# Patient Record
Sex: MALE | Race: WHITE | URBAN - METROPOLITAN AREA
[De-identification: names, ages, dates, MRNs, and addresses within clinical notes are randomized per-mention and may not be internally consistent; named-entity substitution may affect disease eponyms.]

---

## 2024-11-27 ENCOUNTER — APPOINTMENT (RX ONLY)
Dept: URBAN - METROPOLITAN AREA CLINIC 82 | Facility: CLINIC | Age: 19
Setting detail: DERMATOLOGY
End: 2024-11-27

## 2024-11-27 DIAGNOSIS — B07.8 OTHER VIRAL WARTS: ICD-10-CM

## 2024-11-27 PROCEDURE — ? IMMUNOTHERAPY: CANDIDA ANTIGEN

## 2024-11-27 PROCEDURE — ? LIQUID NITROGEN

## 2024-11-27 PROCEDURE — ? COUNSELING

## 2024-11-27 PROCEDURE — 17110 DESTRUCTION B9 LES UP TO 14: CPT

## 2024-11-27 PROCEDURE — 11900 INJECT SKIN LESIONS </W 7: CPT | Mod: 59

## 2024-11-27 ASSESSMENT — LOCATION DETAILED DESCRIPTION DERM
LOCATION DETAILED: RIGHT PROXIMAL DORSAL FOREARM
LOCATION DETAILED: LEFT KNEE

## 2024-11-27 ASSESSMENT — LOCATION SIMPLE DESCRIPTION DERM
LOCATION SIMPLE: RIGHT FOREARM
LOCATION SIMPLE: LEFT KNEE

## 2024-11-27 ASSESSMENT — LOCATION ZONE DERM
LOCATION ZONE: LEG
LOCATION ZONE: ARM

## 2024-11-27 NOTE — PROCEDURE: LIQUID NITROGEN
Render Note In Bullet Format When Appropriate: No
Show Spray Paint Technique Variable?: Yes
Spray Paint Text: The liquid nitrogen was applied to the skin utilizing a spray paint frosting technique.
Medical Necessity Clause: This procedure was medically necessary because the lesions that were treated were:
Detail Level: Detailed
Consent: The patient's consent was obtained including but not limited to risks of crusting, scabbing, blistering, scarring, darker or lighter pigmentary change, recurrence, incomplete removal and infection.
Medical Necessity Information: It is in your best interest to select a reason for this procedure from the list below. All of these items fulfill various CMS LCD requirements except the new and changing color options.
Post-Care Instructions: I reviewed with the patient in detail post-care instructions. Patient is to wear sunprotection, and avoid picking at any of the treated lesions. Pt may apply Vaseline to crusted or scabbing areas.

## 2024-11-27 NOTE — PROCEDURE: IMMUNOTHERAPY: CANDIDA ANTIGEN
Consent was obtained from the patient. The risks of candida antigen injection were discussed in detail. Specifically, the risks of redness, itching, pain and allergic reactions were addressed. Prior to injection all of the patient's questions were answered.
Expiration Date (Optional): Feb 2026
Bill ?: No
Treatment #: 1
Post-Care Instructions: I reviewed with the patient in detail post-care instructions. Mild to moderate itching, tenderness, or swelling at the injection site is common and usually lasts 24 to 48 hours. The patient may elevate the painful area, apply ice for 5 minutes at a time, take tylenol every 4 to 6 hours. Only 5% of Candida immunotherapy patients get flu-like symptoms. This usually lasts only 24 to 48 hours. It is possible to prevent this at future visits by taking tylenol before the injection. If warts are on the fingers, all rings should be removed for 2 days post treatment. The patient understands that multiple treatments may be needed and there is no gaurantee of improvement.
Lot # (Optional): [id number]
Total Amount Injected In Ccs: 0.3
Detail Level: Detailed

## 2024-11-27 NOTE — HPI: SKIN LESIONS
How Severe Is Your Skin Lesion?: moderate
Have Your Skin Lesions Been Treated?: not been treated
Is This A New Presentation, Or A Follow-Up?: Skin Lesions
Additional History: \\n\\nPt is here with mom for what he thinks are warts- left knee and right forearm. No previous treatment, has had warts “cut off” in the past.